# Patient Record
Sex: FEMALE | Race: WHITE | Employment: FULL TIME | URBAN - NONMETROPOLITAN AREA
[De-identification: names, ages, dates, MRNs, and addresses within clinical notes are randomized per-mention and may not be internally consistent; named-entity substitution may affect disease eponyms.]

---

## 2024-07-16 ENCOUNTER — HOSPITAL ENCOUNTER (OUTPATIENT)
Dept: CT IMAGING | Age: 54
Discharge: HOME | End: 2024-07-16
Attending: RADIOLOGY

## 2024-07-16 DIAGNOSIS — Z00.6 EXAMINATION FOR NORMAL COMPARISON FOR CLINICAL RESEARCH: ICD-10-CM

## 2024-07-16 NOTE — PROGRESS NOTES
[facility]  Pulmonary medicine clinic initial consult note.      Patient: Aishwarya Sierra  : 1970      Chief complaint/[place]: Aishwarya Sierra is a 53 y.o. old female came for further evaluation regarding her COPD and shortness of breath with referral from Ms. Mari Higuera CNP.    She is having shortness of breath:Yes  Onset: Gradual  Duration:{NUMBERS 0-12:49477}{DURATION:}.  Diurnal variation:  None.  Worse {Time; morning/afternoon/evenin::\"in the morning\"}  Functional status prior to beginning of symptoms: Distance she can walk on level ground: {NUMBERS; 100-1000 :45049} feet.  Current functional capacity on level ground: Distance she can walk on level ground: {NUMBERS; 100-1000 :16238} feet.  She can climb steps: {YES/NO:::\"No\"}  Flights of steps she can climb: {NUMBERS; 0-10:5044}  She gives a history of orthopnea:{YES/NO:::\"Yes\"}  She gives a history of paroxysmal nocturnal dyspnea:{YES/NO:::\"Yes\"}       She is having cough: {YES/NO:::\"Yes\"}  Duration of cough: for {NUMBERS 1-30:179072853} days.   Her cough is associated with sputum production: {YES/NO:::\"Yes\"}   The sputum color: {COLOR:8866421307::\"clear\"}  Hemoptysis:{YES/NO:::\"No\"}  Diurnal variation: None.     Worse {Time; morning/afternoon/evenin::\"in the morning\"}  Relieving factors: {YES/NO:::\"Yes\"}  Aggravating factors: {YES/NO:::\"No\"}    She is having chest pain:{YES/NO:::\"No\"}  Duration:Days:{NUMBERS 0-12:}  Onset:{DESC; ONSET:8}.  Location:predominantly on {LOCATION:1056844356}  Nature/Quality:{PAIN QUALITY:41860}  Assessment:{PAIN ASSESSMENT:02012}.  Radiation:{Pain radiation-gi:42935}  Scale:{pain scale:091223240}/10  Relation ship to breathing: {Increased/decreased/unchanged:81512} with inspiration.    She is currently using any oxygen supplementation at rest, exercise or during sleep/at night time:

## 2024-07-19 NOTE — PROGRESS NOTES
Neck Circumference -   14.5 inches  Mallampati - 4    Lung Nodule Screening     [x] Qualifies    [] Does not qualify   [] Declined    [] Completed

## 2024-07-22 ENCOUNTER — OFFICE VISIT (OUTPATIENT)
Dept: PULMONOLOGY | Age: 54
End: 2024-07-22
Payer: COMMERCIAL

## 2024-07-22 VITALS
TEMPERATURE: 98 F | SYSTOLIC BLOOD PRESSURE: 118 MMHG | OXYGEN SATURATION: 93 % | HEIGHT: 63 IN | BODY MASS INDEX: 3.79 KG/M2 | DIASTOLIC BLOOD PRESSURE: 80 MMHG | WEIGHT: 21.4 LBS | HEART RATE: 81 BPM

## 2024-07-22 DIAGNOSIS — J44.9 MODERATE COPD (CHRONIC OBSTRUCTIVE PULMONARY DISEASE) (HCC): Primary | ICD-10-CM

## 2024-07-22 PROCEDURE — 3017F COLORECTAL CA SCREEN DOC REV: CPT | Performed by: INTERNAL MEDICINE

## 2024-07-22 PROCEDURE — G8419 CALC BMI OUT NRM PARAM NOF/U: HCPCS | Performed by: INTERNAL MEDICINE

## 2024-07-22 PROCEDURE — 99204 OFFICE O/P NEW MOD 45 MIN: CPT | Performed by: INTERNAL MEDICINE

## 2024-07-22 PROCEDURE — 3023F SPIROM DOC REV: CPT | Performed by: INTERNAL MEDICINE

## 2024-07-22 PROCEDURE — G8427 DOCREV CUR MEDS BY ELIG CLIN: HCPCS | Performed by: INTERNAL MEDICINE

## 2024-07-22 PROCEDURE — 1036F TOBACCO NON-USER: CPT | Performed by: INTERNAL MEDICINE

## 2024-07-22 RX ORDER — ALBUTEROL SULFATE 90 UG/1
2 AEROSOL, METERED RESPIRATORY (INHALATION) EVERY 4 HOURS PRN
COMMUNITY
Start: 2024-04-15

## 2024-07-22 RX ORDER — AZITHROMYCIN 250 MG/1
TABLET, FILM COATED ORAL
Qty: 6 TABLET | Refills: 0 | Status: SHIPPED | OUTPATIENT
Start: 2024-07-22 | End: 2024-08-01

## 2024-07-22 RX ORDER — UMECLIDINIUM BROMIDE AND VILANTEROL TRIFENATATE 62.5; 25 UG/1; UG/1
1 POWDER RESPIRATORY (INHALATION) DAILY
Qty: 1 EACH | Refills: 0 | Status: SHIPPED | OUTPATIENT
Start: 2024-07-22

## 2024-07-22 RX ORDER — CETIRIZINE HYDROCHLORIDE 5 MG/1
5 TABLET ORAL DAILY
COMMUNITY

## 2024-07-22 RX ORDER — PREDNISONE 20 MG/1
40 TABLET ORAL DAILY
Qty: 10 TABLET | Refills: 0 | Status: SHIPPED | OUTPATIENT
Start: 2024-07-22 | End: 2024-11-04

## 2024-07-22 RX ORDER — ALBUTEROL SULFATE 90 UG/1
2 AEROSOL, METERED RESPIRATORY (INHALATION) 4 TIMES DAILY PRN
Qty: 54 G | Refills: 1 | Status: SHIPPED | OUTPATIENT
Start: 2024-07-22

## 2024-07-22 NOTE — PATIENT INSTRUCTIONS
Recommend Flu shot and Pneumonia shot. Recommend continued smoking cessation.   Repeat CT scan with contrast in 3 months and follow up with us afterwards. Have creatinine drawn a day prior to CT scan.   Start using Anoro inhaler 1 puff orally every morning.   Start taking zithromax 2 tablets first day then 1 tablet daily afterwards until gone.  Start taking prednisone 40 daily.     Recommendations/Plan:  -Will start patient on Anora 1puff daily in am. Aishwarya Sierra educated and demonstrated by me in my office how to use Anora. Patient verbalizes understanding. She was detailed about mechanism of action of drug along with associated side effects. She agreed to take the risk and medication. She verbalizes understanding.  -Continue patient on Albuterol HFA 90mcg/Spray MDI, 2puffs  Q6Hprn. She  was informed about adverse effects of Albuterol HFA. She verbalizes understanding.  -Will send Iunx-2-Zkygofdkoyn swab today and to be followed at next clinic visit.   -Will prescribe Anoro inhaler 1 puff qd.   -Will obtain Sputum cx.  She was advised to call my office 2 to 3 days after doing sputum samples to go over the sputum culture results and further management  -Will have pt start zpack and prednisone qd x 5d.  -Will have her obtain CT w/ Contrast in 3 months to follow mediastinal lymphadenopathy of uncertain etiology  -Will have her follow up in office after CT.  -She was educated and demonstrated in my office how to use inhalers.   -Aishwarya Sierra advised to continue prescribed inhalers and keep good compliance.  - Patient educated to update his pneumococcal vaccine, Covid vaccine, with family physician and take influenza vaccine in coming season with out fail. Patient verbalizes understanding.  -Schedule patient for Pulmonary rehab consult as soon as possible for pulmonary rehab therapy for her COPD.  -Schedule patient for follow up with my clinic in 3months with a CT scan of chest with IV contrast. She was advised to

## 2024-07-22 NOTE — PROGRESS NOTES
[facility]  Pulmonary medicine clinic initial consult note.      Patient: Aishwarya Sierra  : 1970      Chief complaint/[place]: Aishwarya Sierra is a 53 y.o. old female came for further evaluation regarding her COPD and shortness of breath with referral from Ms. Mari Higuera CNP.  First visit with a pulmonologist. Quit smoking 2024. Once she got the shortness of breath was down for a few days. Wasn't able to move around. Until she was given steroids. Says it started when she was mowing grass. She came inside coughing and sneezing and breathing became progressively worse. This lasted until she got steroids. No hx of seasonal allergies prior to this year. Says feels particularly well today after weather change. After steroids bounced back. Was getting some drainage. Then last week was down again. Then was given another dose of steroids. She finished Saturday. Still haven't recovered sense of smell and taste from . Nose has been congested since that date. Has been using a lavage tid.   Still has hoarseness.   Patient endorses working at [employer]. She makes air conditioning and refrigeration devices.       She is having shortness of breath:Yes  Onset: Rapid  Duration:2 months.  Diurnal variation:  None.  Worse throughout the day.   Functional status prior to beginning of symptoms: Distance she can walk on level ground: Two miles per day.  Current functional capacity on level ground: Distance she can walk on level ground: has avoided going outside much. Maybe 200 feet when she has extreme symptoms.  She can climb steps: Yes  Flights of steps she can climb: 3  She gives a history of orthopnea:No - has thick drainage which sits on her chest. She has to take albuterol to loosen it up. Once she coughs it up she can lay down again.   She gives a history of paroxysmal nocturnal dyspnea:No       She is having cough: Yes  Duration of cough: for 2

## 2024-07-23 ENCOUNTER — TELEPHONE (OUTPATIENT)
Dept: CARDIAC REHAB | Age: 54
End: 2024-07-23

## 2024-07-23 NOTE — TELEPHONE ENCOUNTER
PULMONARY REHABILITATION REFERRAL  COPD Exacerbation    Pulmonary Rehab Evaluation order received. Called pt at this time. There was no answer.  Pt is from [place], [place] so left message  stating that I am sending order to [facility] in [place] since much closer to her home. Also stated if she does want to do here to call us back at [phone].

## 2024-08-05 ENCOUNTER — TELEPHONE (OUTPATIENT)
Dept: PULMONOLOGY | Age: 54
End: 2024-08-05

## 2024-08-05 NOTE — TELEPHONE ENCOUNTER
Mari Higuera's office calling in on this patient they referred.  Please fax her office note from 7/22/2024 with Dr Fowler to them at fax # [phone].

## 2024-08-08 ENCOUNTER — TELEPHONE (OUTPATIENT)
Dept: PULMONOLOGY | Age: 54
End: 2024-08-08

## 2024-08-08 NOTE — TELEPHONE ENCOUNTER
Pt called and was given Anoro and it is not working for her. Still unable to get a deep breath.  Feels like she needs something with a steroid to open her up.  She uses [facility] in [place]. Last seen 7/22/24, next appt 10/17/24  Please advise.

## 2024-10-04 ENCOUNTER — TELEPHONE (OUTPATIENT)
Dept: PULMONOLOGY | Age: 54
End: 2024-10-04

## 2024-10-04 NOTE — TELEPHONE ENCOUNTER
Called patient's insurance, [employer], provider line at [phone] to see if prior authorization is required for CT chest with contrast scheduled for 10/11/24 at [facility].     After going through automated service, it was determined prior authorization may be required for CPT code 13825 with diagnosis code J44.9 at all places of service.  Last four of reference number: [id number]    Transferred to [facility] request line.  Spoke to representative, Elvira.  After starting authorization request Elvira stated [facility] already submitted and received approval for CPT code 18612 valid 9/27/24-11/2024.    Case number: [id number]

## 2024-10-04 NOTE — TELEPHONE ENCOUNTER
Michael Mahoney Authorization to verify authorization was received by their office for CT chest with contrast scheduled for 10/11/24 at their facility.  Oly did verify authorization request was submitted by Doming[facility] and approval was received.  Gave Oly [employer] case number from [facility], [id number].

## 2024-10-17 ENCOUNTER — OFFICE VISIT (OUTPATIENT)
Dept: PULMONOLOGY | Age: 54
End: 2024-10-17
Payer: COMMERCIAL

## 2024-10-17 VITALS
DIASTOLIC BLOOD PRESSURE: 82 MMHG | WEIGHT: 226.2 LBS | HEIGHT: 63 IN | OXYGEN SATURATION: 99 % | SYSTOLIC BLOOD PRESSURE: 126 MMHG | HEART RATE: 91 BPM | TEMPERATURE: 97.6 F | BODY MASS INDEX: 40.08 KG/M2

## 2024-10-17 DIAGNOSIS — J44.9 MODERATE COPD (CHRONIC OBSTRUCTIVE PULMONARY DISEASE) (HCC): ICD-10-CM

## 2024-10-17 DIAGNOSIS — Z77.22 TOBACCO SMOKE EXPOSURE: ICD-10-CM

## 2024-10-17 DIAGNOSIS — R59.0 MEDIASTINAL LYMPHADENOPATHY: Primary | ICD-10-CM

## 2024-10-17 PROCEDURE — G8427 DOCREV CUR MEDS BY ELIG CLIN: HCPCS | Performed by: INTERNAL MEDICINE

## 2024-10-17 PROCEDURE — 3017F COLORECTAL CA SCREEN DOC REV: CPT | Performed by: INTERNAL MEDICINE

## 2024-10-17 PROCEDURE — 1036F TOBACCO NON-USER: CPT | Performed by: INTERNAL MEDICINE

## 2024-10-17 PROCEDURE — 99214 OFFICE O/P EST MOD 30 MIN: CPT | Performed by: INTERNAL MEDICINE

## 2024-10-17 PROCEDURE — G8419 CALC BMI OUT NRM PARAM NOF/U: HCPCS | Performed by: INTERNAL MEDICINE

## 2024-10-17 PROCEDURE — 3023F SPIROM DOC REV: CPT | Performed by: INTERNAL MEDICINE

## 2024-10-17 PROCEDURE — G8484 FLU IMMUNIZE NO ADMIN: HCPCS | Performed by: INTERNAL MEDICINE

## 2024-10-17 NOTE — PATIENT INSTRUCTIONS
Recommendations/Plan:  -Continue Trelegy Ellipta 100mcg/ 62.5mcg/25mcg per puff, 1puff daily in am. Aishwarya Sierra educated and demonstrated in my office how to use Trelegy Ellipta. She verbalizes understanding. She was detailed about mechanism of action of drug along with associated side effects. She agreed to take the risk and medication. She verbalizes understanding.  -Continue Albuterol HFA 90mcg/Spray MDI, 2puffs  Q6Hprn. She was informed about adverse effects of Albuterol HFA. She verbalizes understanding.  -Will reschedule her CT scan of chest with IV contrast to follow her mediastinal lymphadenopathy of uncertain etiology noted on her previous CT scan of chest done on 11 July 2024  -Aishwarya Sierra advised to continue prescribed inhalers and keep good compliance.  - Patient educated to update his pneumococcal vaccine, Covid vaccine, with family physician and take influenza vaccine in coming season with out fail. Patient verbalizes understanding.  -She was offered a Pulmonary rehab consult for pulmonary rehab therapy for her  COPD once cleared by her family physician. How ever at the end of discussion she refused and verbalizes understanding of consequences of her decision.  -She was advised to keep her scheduled appointment with the [facility] allergy immunology service regarding her allergic rhinitis management and allergy testing.  -Schedule patient for follow up with my clinic in 1month with a CT scan of chest with IV contrast. She was advised to make early appointment if needed.  -Aishwarya Sierra was advised to make early appointment with my clinic if she develops any constitutional symptoms including loss of weight, poor appetite or hemoptysis. She verbalizes under standing.  -Aishwarya Sierra educated about my impression and plan. She verbalizes understanding.

## 2024-10-17 NOTE — PROGRESS NOTES
Neck Circumference -   14.5 inches  Mallampati - 4    Lung Nodule Screening     [] Qualifies    [] Does not qualify   [] Declined    [] Completed    
complaints.  Hematological: Negative.   Psychiatric/Behavioral: Negative.   Skin: No itching.      Current Medications:          Past Medical History:   Diagnosis Date    Shortness of breath        Past Surgical History:   Procedure Laterality Date     SECTION  1993    THYROIDECTOMY, PARTIAL Right 2019       Allergies   Allergen Reactions    Valproic Acid Other (See Comments)     Other Reaction(s): Other - comment required    Severe hepatic impairment       Current Outpatient Medications   Medication Sig Dispense Refill    fluticasone-umeclidin-vilant (TRELEGY ELLIPTA) 100-62.5-25 MCG/ACT AEPB inhaler Inhale 1 puff into the lungs daily 1 each 11    albuterol sulfate HFA (PROVENTIL;VENTOLIN;PROAIR) 108 (90 Base) MCG/ACT inhaler Inhale 2 puffs into the lungs every 4 hours as needed      cetirizine (ZYRTEC) 5 MG tablet Take 1 tablet by mouth daily      albuterol sulfate HFA (VENTOLIN HFA) 108 (90 Base) MCG/ACT inhaler Inhale 2 puffs into the lungs 4 times daily as needed for Wheezing 54 g 1    predniSONE (DELTASONE) 20 MG tablet Take 2 tablets by mouth daily 10 tablet 0     No current facility-administered medications for this visit.       Family History   Problem Relation Age of Onset    Colon Cancer Mother     Kidney Cancer Father            Physical Exam:    VITALS:  /82 (Site: Left Upper Arm, Position: Sitting, Cuff Size: Medium Adult)   Pulse 91   Temp 97.6 °F (36.4 °C) (Skin)   Ht 1.6 m (5' 3\")   Wt 102.6 kg (226 lb 3.2 oz)   SpO2 99% Comment: Patient on room air  BMI 40.07 kg/m²   Nursing note and vitals reviewed.  Constitutional: Patient appears moderately built and moderately nourished. No distress. Patient is oriented to person, place, and time.  HENT:   Head: Normocephalic and atraumatic.   Right Ear: External ear normal.   Left Ear: External ear normal.   Mouth/Throat: Oropharynx is clear and moist.  No oral thrush.  Eyes: Conjunctivae are normal. Pupils are equal, round, and reactive to

## 2024-10-28 DIAGNOSIS — Z77.22 TOBACCO SMOKE EXPOSURE: ICD-10-CM

## 2024-10-28 DIAGNOSIS — R59.0 MEDIASTINAL LYMPHADENOPATHY: ICD-10-CM

## 2024-10-28 DIAGNOSIS — J44.9 MODERATE COPD (CHRONIC OBSTRUCTIVE PULMONARY DISEASE) (HCC): ICD-10-CM

## 2024-10-29 ENCOUNTER — HOSPITAL ENCOUNTER (OUTPATIENT)
Dept: CT IMAGING | Age: 54
Discharge: HOME | End: 2024-10-29
Attending: RADIOLOGY

## 2024-10-29 DIAGNOSIS — Z00.6 ENCOUNTER FOR EXAMINATION FOR NORMAL COMPARISON OR CONTROL IN CLINICAL RESEARCH PROGRAM: ICD-10-CM

## 2024-11-06 NOTE — PROGRESS NOTES
[facility]  Pulmonary medicine clinic follow up note.      Patient: Aishwarya Sierra  : 1970      Chief complaint/[place]: Aishwarya Sierra is a 54 y.o. old female came for follow-up regarding her moderately severe COPD after having recommended testing including CT scan of chest with IV contrast.     She is being evaluated by [facility] allergy immunology service regarding her allergies and stuffiness of the nose. She underwent allergy testing and found to have multiple allergies.  She was advised to go on allergy injections by her allergy immunology specialist.  Patient decided to not to go for any allergy injections.  She was prescribed with Ryaltris nasal spray. She is using her nasal spray with good relief of symptoms.    She was initially referred from Ms. Mari Higuera CNP.    On today's questioning:  She denies cough or expectoration.  She denies hemoptysis.  She denies fever or chills.   She denies recent hospitalizations or emergency room visits.     She is using her prescribed inhalers with excellent compliance.   She is using her rescue inhaler rarely.     She denies recent loss of weight or appetite changes.   She denies recent decline in functional status    She is currently using following inhalers:  Trelegy 100/62.5/25 1 puff via inhalation daily.  Albuterol HFA 2 puffs every 6 hourly as needed  Zyrtec 10 mg p.o. nightly for her allergic rhinitis    She is currently using any oxygen supplementation at rest, exercise or during sleep/at night time: No    She ever diagnosed with connective tissue diseases including Systemic lupus Erythematosus, Rheumatoid arthritis etc:NO    She ever diagnosed with pulmonary tuberculosis in the past:NO  She ever recently exposed to patients with tuberculosis:NO  She ever recently travelled to endemic places of tuberculosis or out side [place]:NO  Her ever tested for PPD in the past: NO    She ever diagnosed with COVID-19

## 2024-12-05 ENCOUNTER — OFFICE VISIT (OUTPATIENT)
Dept: PULMONOLOGY | Age: 54
End: 2024-12-05
Payer: COMMERCIAL

## 2024-12-05 VITALS
TEMPERATURE: 97.8 F | DIASTOLIC BLOOD PRESSURE: 82 MMHG | OXYGEN SATURATION: 97 % | HEIGHT: 63 IN | WEIGHT: 233 LBS | BODY MASS INDEX: 41.29 KG/M2 | HEART RATE: 75 BPM | SYSTOLIC BLOOD PRESSURE: 116 MMHG

## 2024-12-05 DIAGNOSIS — J44.9 MODERATE COPD (CHRONIC OBSTRUCTIVE PULMONARY DISEASE) (HCC): ICD-10-CM

## 2024-12-05 DIAGNOSIS — Z87.891 PERSONAL HISTORY OF TOBACCO USE, PRESENTING HAZARDS TO HEALTH: Primary | ICD-10-CM

## 2024-12-05 PROCEDURE — 3017F COLORECTAL CA SCREEN DOC REV: CPT | Performed by: INTERNAL MEDICINE

## 2024-12-05 PROCEDURE — G8484 FLU IMMUNIZE NO ADMIN: HCPCS | Performed by: INTERNAL MEDICINE

## 2024-12-05 PROCEDURE — 3023F SPIROM DOC REV: CPT | Performed by: INTERNAL MEDICINE

## 2024-12-05 PROCEDURE — G8427 DOCREV CUR MEDS BY ELIG CLIN: HCPCS | Performed by: INTERNAL MEDICINE

## 2024-12-05 PROCEDURE — 99214 OFFICE O/P EST MOD 30 MIN: CPT | Performed by: INTERNAL MEDICINE

## 2024-12-05 PROCEDURE — G8417 CALC BMI ABV UP PARAM F/U: HCPCS | Performed by: INTERNAL MEDICINE

## 2024-12-05 PROCEDURE — 1036F TOBACCO NON-USER: CPT | Performed by: INTERNAL MEDICINE

## 2024-12-05 RX ORDER — OLOPATADINE HYDROCHLORIDE AND MOMETASONE FUROATE 25; 665 UG/1; UG/1
SPRAY, METERED NASAL
COMMUNITY

## 2024-12-05 NOTE — PATIENT INSTRUCTIONS
Recommendations/Plan:  -Continue Trelegy Ellipta 100mcg/ 62.5mcg/25mcg per puff, 1puff daily in am. Refills given for 1year.  -Continue Albuterol HFA 90mcg/Spray MDI, 2puffs  Q6Hprn.   -Aishwarya Sierra advised to continue prescribed inhalers and keep good compliance.  -Patient educated to update his pneumococcal vaccine, Covid vaccine, with family physician and take influenza vaccine in coming season with out fail. Patient verbalizes understanding.  -Schedule patient for low dose CT scan of chest with out IV contrast as recommended by the  U.S. Preventive Services Task Force and the NCCN for lung Cancer Screening in 1year.  -Schedule patient for Spirometry before clinic visit with Bronchodilator response in 1year.  -She was advised to keep her scheduled appointment with the [facility] allergy immunology service regarding her allergic rhinitis management and allergy testing.  -Schedule patient for follow up with my clinic in 1year with low dose CT scan of chest with out IV contrast and Spirometry with BD response to be done 2 days before clinic visit. She was advised to make early appointment if needed.  -Aishwarya Sierra was advised to make early appointment with my clinic if she develops any constitutional symptoms including loss of weight, poor appetite or hemoptysis. She verbalizes under standing.  -Aishwarya Sierra educated about my impression and plan. She verbalizes understanding.

## 2024-12-05 NOTE — PROGRESS NOTES
Neck Circumference -   14.5 inches  Mallampati - 4    Lung Nodule Screening     [] Qualifies    [] Does not qualify   [] Declined    [] Completed